# Patient Record
(demographics unavailable — no encounter records)

---

## 2024-12-19 NOTE — ASSESSMENT
[FreeTextEntry1] : 7-year-old female with left distal fibula avulsion fracture DOI 12/17/24  The history was obtained today from the child and parent; given the patient's age and/or the child's mental capacity, the history was unreliable and the parent was used as an independent historian.   We reviewed the child's clinical exam today as well as outside imaging.  She appears to have a small linear avulsion fracture off the distal tip of her fibula which coincides with her clinical tenderness.  My recommendation at this time is to utilize a cam boot for immobilization.  We discussed utilizing an orthotist group here today in office versus getting the boot over-the-counter.  Family opted for obtaining the boot  over-the-counter potentially through [employer] or at a surgical supply store.  She will remain out of all gym and sports at this time.  School note was provided.  She should not participate in her ski trip at the end of the month.  We will see her back in 4 weeks for x-rays of the left ankle and possible advancing activity. This plan was discussed with family and all questions and concerns were addressed today.  IShona PA-C, have acted as a scribe and documented the above for Dr. Lozoya  The above documentation completed by the scribe is an accurate record of both my words and actions.
yes

## 2024-12-19 NOTE — DATA REVIEWED
[de-identified] : My review and interpretation of the radiologic studies: Outside imaging in [facility] on December 17, 2024 demonstrates a small linear avulsion fracture at the tip of the distal fibula with soft tissue swelling.

## 2024-12-19 NOTE — HISTORY OF PRESENT ILLNESS
[FreeTextEntry1] : Natalee 7-year-old female who was brought in today by her mother for evaluation of her left ankle.  She reports that 2 days ago on 12/17/2024, she was running down the stairs when she twisted her left ankle.  She experienced some discomfort in reported to her mother after she got home that day.  She was taken for x-rays which showed a minor finding that may represent possible small fracture of the distal fibula.  She was diagnosed with a sprain/possible avulsion fracture of the distal fibula.  She was provided with an Aircast and crutches.  She reports that she is feeling better since the initial injury.  She continues to have some swelling in the lateral ankle as well as some bruising.  Father is wondering if she will be able to participate in a ski trip but is coming up.  They are planning on leaving on December 26.  She is here today for further orthopedic evaluation and management.
a few with stimulation

## 2024-12-19 NOTE — PHYSICAL EXAM
[FreeTextEntry1] : Healthy appearing 7 year-old child. Awake, alert, in no acute distress. Pleasant and cooperative.  Eyes are clear with no sclera abnormalities. External ears, nose and mouth are clear.  Good respiratory effort with no audible wheezing without use of a stethoscope. Ambulates utilizing crutches and Aircast to left lower extremity, good coordination and balance.  Focused examination of the L ankle: Skin is clean, dry and intact.  There is moderate swelling noted to the lateral aspect of the ankle.  There is some mild ecchymosis present as well.  He is discretely tender at the very tip of the distal fibula.  Range of motion deferred today due to injury. Sensation is intact to light touch distally. 5/5 strength in EHL/FHL/TA/GS DP 2+, brisk capillary refill less than 2 seconds in all digits

## 2024-12-19 NOTE — REASON FOR VISIT
[Post Urgent Care] : a post urgent care visit [Patient] : patient [Father] : father [FreeTextEntry1] : left ankle injury

## 2025-01-16 NOTE — HISTORY OF PRESENT ILLNESS
[FreeTextEntry1] : Natalee is a 7-year-old female who returns today accompanied by her father for follow up evaluation of her left ankle. She reports that 1 month ago, she was running down the stairs when she twisted her left ankle. She experienced some discomfort in reported to her mother after she got home that day. She was taken for x-rays which showed a minor finding that may represent possible small fracture of the distal fibula. She was diagnosed with a sprain/possible avulsion fracture of the distal fibula. She was provided with an Aircast and crutches. We then saw her in office on 12/19/24.  She reported that she was alreay feeling better since the initial injury, though she continued to have some swelling in the lateral ankle as well as some bruising. We discussed aircast vs cam boot and gym restrictions, with follow up today for imaging clearance.  Today, she returns today office and is overall doing well. She has been back in regular shoe wear x 2 weeks now. She reports she has no pain in the ankle and returned to gym class already. She has no concerns or complaints. She is here today for further orthopedic evaluation and management.

## 2025-01-16 NOTE — DATA REVIEWED
[de-identified] : My interpretation and review of images taken today, 01/16/2025, in office:  Left ankle XR 3 views demonstrating no obvious fracture or dislocation. There is a small fragment of bone distal fibula that may be secondary ossification vs avulsion, though no periosteal reaction or callus is noted.

## 2025-01-16 NOTE — PHYSICAL EXAM
[FreeTextEntry1] : Healthy appearing 7-year-old child. Awake, alert, in no acute distress. Pleasant and cooperative.  Eyes are clear with no sclera abnormalities. External ears, nose and mouth are clear.  Good respiratory effort with no audible wheezing without use of a stethoscope. Ambulates independently with no evidence of antalgia. Good coordination and balance. Able to get on and off exam table without difficulty.  Focused examination of the left ankle: Skin is clean, dry and intact. There is no erythema, swelling or ecchymosis. She is nontender to palpation grossly over bony and ligamentous anatomy of the ankle. There is no pain or instability with passive inversion or eversion of the foot. Good subtalar motion is appreciated. Heels reconstitute into varus when on toes. Sensation is intact to light touch distally. 5/5 strength in EHL/FHL/TA/GS DP 2+, brisk capillary refill less than 2 seconds in all digits

## 2025-01-16 NOTE — REASON FOR VISIT
[Follow Up] : a follow up visit [Patient] : patient [Father] : father [FreeTextEntry1] : left ankle injury 12/2024

## 2025-01-16 NOTE — ASSESSMENT
[FreeTextEntry1] : 7 year old female with left ankle sprain 12/16/24, now asymptomatic.  The history was obtained today from the child and parent; given the patient's age and/or the child's mental capacity, the history was unreliable and the parent was used as an independent historian.   Natalee is doing very well. Her clinical exam is unremarkable today and she has no pain or limitations with motion. She had left ankle x-rays taken today in office. These were independently reviewed with family. There is no evidence of fracture or periosteal reaction. There is a small ossicle of distal fibula noted on lateral that may represent a secondary ossification center, for which there are no concerns or interventions needed- this is her normal anatomy. She is clear for all activity at this point, school note provided. She will follow up with us on an as needed basis if any further concerns arise. This plan was discussed with family and all questions and concerns were addressed today.  IShona PA-C, have acted as a scribe and documented the above for Dr. Lozoya  The above documentation completed by the scribe is an accurate record of both my words and actions.